# Patient Record
Sex: MALE | Race: WHITE | NOT HISPANIC OR LATINO | Employment: OTHER | URBAN - METROPOLITAN AREA
[De-identification: names, ages, dates, MRNs, and addresses within clinical notes are randomized per-mention and may not be internally consistent; named-entity substitution may affect disease eponyms.]

---

## 2018-07-10 ENCOUNTER — OFFICE VISIT (OUTPATIENT)
Dept: UROLOGY | Facility: MEDICAL CENTER | Age: 73
End: 2018-07-10
Payer: MEDICARE

## 2018-07-10 VITALS
BODY MASS INDEX: 21.7 KG/M2 | WEIGHT: 155 LBS | DIASTOLIC BLOOD PRESSURE: 80 MMHG | SYSTOLIC BLOOD PRESSURE: 136 MMHG | HEIGHT: 71 IN

## 2018-07-10 DIAGNOSIS — N52.02 CORPORO-VENOUS OCCLUSIVE ERECTILE DYSFUNCTION: ICD-10-CM

## 2018-07-10 DIAGNOSIS — N40.0 BPH WITHOUT OBSTRUCTION/LOWER URINARY TRACT SYMPTOMS: Primary | ICD-10-CM

## 2018-07-10 LAB
SL AMB  POCT GLUCOSE, UA: ABNORMAL
SL AMB LEUKOCYTE ESTERASE,UA: ABNORMAL
SL AMB POCT BILIRUBIN,UA: ABNORMAL
SL AMB POCT BLOOD,UA: ABNORMAL
SL AMB POCT CLARITY,UA: CLEAR
SL AMB POCT COLOR,UA: YELLOW
SL AMB POCT KETONES,UA: ABNORMAL
SL AMB POCT NITRITE,UA: ABNORMAL
SL AMB POCT PH,UA: 5
SL AMB POCT SPECIFIC GRAVITY,UA: 1.02
SL AMB POCT URINE PROTEIN: ABNORMAL
SL AMB POCT UROBILINOGEN: 0.2

## 2018-07-10 PROCEDURE — 99214 OFFICE O/P EST MOD 30 MIN: CPT | Performed by: UROLOGY

## 2018-07-10 PROCEDURE — 81003 URINALYSIS AUTO W/O SCOPE: CPT | Performed by: UROLOGY

## 2018-07-10 RX ORDER — SIMVASTATIN 20 MG
TABLET ORAL
COMMUNITY
Start: 2018-07-07

## 2018-07-10 RX ORDER — SILDENAFIL CITRATE 20 MG/1
100 TABLET ORAL AS NEEDED
Qty: 90 TABLET | Refills: 11 | Status: SHIPPED | OUTPATIENT
Start: 2018-07-10 | End: 2019-07-17

## 2018-07-10 RX ORDER — LEVOTHYROXINE SODIUM 0.05 MG/1
75 TABLET ORAL
COMMUNITY
Start: 2018-05-26

## 2018-07-10 NOTE — PROGRESS NOTES
Assessment/Plan:  1  BPH without obstructive symptoms  The patient is voiding without obstructive irritative voiding symptoms  PSA is stable and within normal limits  No further PSA testing is performed at this time due to his age of 74 years and stability of PSA and DR SOFIA Pham   Erectile dysfunction  The patient responds nicely to sildenafil 100 mg  Prescription was reordered  No problem-specific Assessment & Plan notes found for this encounter  Diagnoses and all orders for this visit:    BPH without obstruction/lower urinary tract symptoms  -     POCT urine dip auto non-scope  -     Comprehensive metabolic panel; Future    Corporo-venous occlusive erectile dysfunction  -     sildenafil (REVATIO) 20 mg tablet; Take 5 tablets (100 mg total) by mouth as needed (Erectile dysfunction)    Other orders  -     levothyroxine 50 mcg tablet;   -     simvastatin (ZOCOR) 20 mg tablet;           Subjective:      Patient ID: Suly Clay is a 74 y o  male  HPI 73-year-old male with a history of erectile dysfunction as well as prostatic enlargement presents denying dysuria frequency urgency gross hematuria incontinence or appreciable nocturia  The patient does have a good urinary stream by history  He notes responding well to Viagra 100 mg ordered as sildenafil for erectile dysfunction which previously was all marked by decreased rigidity and early loss of erection  The patient now is responding to pharmacologic therapy in that regard  The following portions of the patient's history were reviewed and updated as appropriate: allergies, current medications, past family history, past medical history, past social history, past surgical history and problem list     Review of Systems   Constitutional: Negative  HENT: Negative  Eyes: Negative  Respiratory: Negative  Cardiovascular: Negative  Gastrointestinal: Negative  Endocrine: Negative  Genitourinary: Negative      Musculoskeletal: Negative  Allergic/Immunologic: Negative  Neurological: Negative  Hematological: Negative  Psychiatric/Behavioral: Negative  Objective:      /80   Ht 5' 11" (1 803 m)   Wt 70 3 kg (155 lb)   BMI 21 62 kg/m²          Physical Exam   Constitutional: He is oriented to person, place, and time  He appears well-developed and well-nourished  HENT:   Head: Normocephalic and atraumatic  Eyes: Conjunctivae and EOM are normal    Neck: Neck supple  Pulmonary/Chest: Effort normal  No respiratory distress  Abdominal: Soft  Genitourinary: Penis normal    Genitourinary Comments: Phallus normal circumcised without cutaneous lesions  Meatus patent and normally placed  No palpable Peyronie's plaques  Scrotum normal without cutaneous lesions  Testes adnexa within normal limits without induration masses palpable fluid collection or tenderness  Perineum palpably normal   Digital rectal examination reveals a normal anal verge normal anal sphincter tone no palpable rectal masses and a 30 g a nodular nontender prostate   Neurological: He is alert and oriented to person, place, and time  Psychiatric: He has a normal mood and affect  His behavior is normal  Judgment and thought content normal    Vitals reviewed

## 2018-07-10 NOTE — LETTER
July 10, 2018     Darrall Ada  [address]    Patient: Mera Lindsey   YOB: 1945   Date of Visit: 7/10/2018       Dear Dr Michael Conner: Thank you for referring Kate Hoang to me for evaluation  Below are my notes for this consultation  If you have questions, please do not hesitate to call me  I look forward to following your patient along with you  Sincerely,        Khris Kirby MD        CC: No Recipients  Khris Kirby MD  7/10/2018 11:12 AM  Sign at close encounter  Assessment/Plan:  1  BPH without obstructive symptoms  The patient is voiding without obstructive irritative voiding symptoms  PSA is stable and within normal limits  No further PSA testing is performed at this time due to his age of 74 years and stability of PSA and DR SOFIA Pham   Erectile dysfunction  The patient responds nicely to sildenafil 100 mg  Prescription was reordered  No problem-specific Assessment & Plan notes found for this encounter  Diagnoses and all orders for this visit:    BPH without obstruction/lower urinary tract symptoms  -     POCT urine dip auto non-scope  -     Comprehensive metabolic panel; Future    Corporo-venous occlusive erectile dysfunction  -     sildenafil (REVATIO) 20 mg tablet; Take 5 tablets (100 mg total) by mouth as needed (Erectile dysfunction)    Other orders  -     levothyroxine 50 mcg tablet;   -     simvastatin (ZOCOR) 20 mg tablet;           Subjective:      Patient ID: Mera Lindsey is a 74 y o  male  HPI 68-year-old male with a history of erectile dysfunction as well as prostatic enlargement presents denying dysuria frequency urgency gross hematuria incontinence or appreciable nocturia  The patient does have a good urinary stream by history  He notes responding well to Viagra 100 mg ordered as sildenafil for erectile dysfunction which previously was all marked by decreased rigidity and early loss of erection    The patient now is responding to pharmacologic therapy in that regard  The following portions of the patient's history were reviewed and updated as appropriate: allergies, current medications, past family history, past medical history, past social history, past surgical history and problem list     Review of Systems   Constitutional: Negative  HENT: Negative  Eyes: Negative  Respiratory: Negative  Cardiovascular: Negative  Gastrointestinal: Negative  Endocrine: Negative  Genitourinary: Negative  Musculoskeletal: Negative  Allergic/Immunologic: Negative  Neurological: Negative  Hematological: Negative  Psychiatric/Behavioral: Negative  Objective:      /80   Ht 5' 11" (1 803 m)   Wt 70 3 kg (155 lb)   BMI 21 62 kg/m²           Physical Exam   Constitutional: He is oriented to person, place, and time  He appears well-developed and well-nourished  HENT:   Head: Normocephalic and atraumatic  Eyes: Conjunctivae and EOM are normal    Neck: Neck supple  Pulmonary/Chest: Effort normal  No respiratory distress  Abdominal: Soft  Genitourinary: Penis normal    Genitourinary Comments: Phallus normal circumcised without cutaneous lesions  Meatus patent and normally placed  No palpable Peyronie's plaques  Scrotum normal without cutaneous lesions  Testes adnexa within normal limits without induration masses palpable fluid collection or tenderness  Perineum palpably normal   Digital rectal examination reveals a normal anal verge normal anal sphincter tone no palpable rectal masses and a 30 g a nodular nontender prostate   Neurological: He is alert and oriented to person, place, and time  Psychiatric: He has a normal mood and affect  His behavior is normal  Judgment and thought content normal    Vitals reviewed

## 2018-07-10 NOTE — PROGRESS NOTES
IPSS Questionnaire (AUA-7): Over the past month    1)  How often have you had a sensation of not emptying your bladder completely after you finish urinating? 1 - Less than 1 time in 5   2)  How often have you had to urinate again less than two hours after you finished urinating? 0 - Not at all   3)  How often have you found you stopped and started again several times when you urinated? 0 - Not at all   4) How difficult have you found it to postpone urination? 0 - Not at all   5) How often have you had a weak urinary stream?  1 - Less than 1 time in 5   6) How often have you had to push or strain to begin urination?   0 - Not at all   7) How many times did you most typically get up to urinate from the time you went to bed until the time you got up in the morning?  0 - None   Total Score:  2

## 2019-07-17 ENCOUNTER — OFFICE VISIT (OUTPATIENT)
Dept: UROLOGY | Facility: MEDICAL CENTER | Age: 74
End: 2019-07-17
Payer: MEDICARE

## 2019-07-17 VITALS
SYSTOLIC BLOOD PRESSURE: 124 MMHG | HEIGHT: 70 IN | BODY MASS INDEX: 21.47 KG/M2 | DIASTOLIC BLOOD PRESSURE: 78 MMHG | HEART RATE: 68 BPM | WEIGHT: 150 LBS

## 2019-07-17 DIAGNOSIS — N52.02 CORPORO-VENOUS OCCLUSIVE ERECTILE DYSFUNCTION: ICD-10-CM

## 2019-07-17 DIAGNOSIS — N40.0 BPH WITHOUT OBSTRUCTION/LOWER URINARY TRACT SYMPTOMS: Primary | ICD-10-CM

## 2019-07-17 LAB
SL AMB  POCT GLUCOSE, UA: ABNORMAL
SL AMB LEUKOCYTE ESTERASE,UA: ABNORMAL
SL AMB POCT BILIRUBIN,UA: ABNORMAL
SL AMB POCT BLOOD,UA: ABNORMAL
SL AMB POCT CLARITY,UA: CLEAR
SL AMB POCT COLOR,UA: YELLOW
SL AMB POCT KETONES,UA: ABNORMAL
SL AMB POCT NITRITE,UA: ABNORMAL
SL AMB POCT PH,UA: 5.5
SL AMB POCT SPECIFIC GRAVITY,UA: 1.02
SL AMB POCT URINE PROTEIN: ABNORMAL
SL AMB POCT UROBILINOGEN: 0.2

## 2019-07-17 PROCEDURE — 99214 OFFICE O/P EST MOD 30 MIN: CPT | Performed by: UROLOGY

## 2019-07-17 PROCEDURE — 81003 URINALYSIS AUTO W/O SCOPE: CPT | Performed by: UROLOGY

## 2019-07-17 RX ORDER — SILDENAFIL 100 MG/1
100 TABLET, FILM COATED ORAL DAILY PRN
Qty: 15 TABLET | Refills: 5 | Status: SHIPPED | OUTPATIENT
Start: 2019-07-17

## 2019-07-17 NOTE — PROGRESS NOTES
Assessment/Plan:  1  BPH without obstructive symptoms-the patient is status post TURP many years ago and is voiding well with good urinary stream no obstructive irritative voiding symptoms  AUA symptom score is 3 with patient noting occasional minimal weakening of the urinary stream but otherwise normal voiding  The patient does request PSA testing despite AUA guidelines  2  Erectile dysfunction-the patient response to Viagra 100 mg and a prescription was written for that  No problem-specific Assessment & Plan notes found for this encounter  Diagnoses and all orders for this visit:    BPH without obstruction/lower urinary tract symptoms  -     POCT urine dip auto non-scope  -     Comprehensive metabolic panel; Future    Corporo-venous occlusive erectile dysfunction  -     sildenafil (VIAGRA) 100 mg tablet; Take 1 tablet (100 mg total) by mouth daily as needed for erectile dysfunction          Subjective:      Patient ID: Lovely Hanson is a 66 y o  male  HPI  75-year-old male well known to me status post TURP many years ago noting a good urinary stream full continence no dysuria frequency urgency  The patient continues to utilize sildenafil 100 mg for adequate erectile response  He requests another prescription  The following portions of the patient's history were reviewed and updated as appropriate: allergies, current medications, past family history, past medical history, past social history, past surgical history and problem list     Review of Systems   All other systems reviewed and are negative  Objective:      /78   Pulse 68   Ht 5' 10" (1 778 m)   Wt 68 kg (150 lb)   BMI 21 52 kg/m²          Physical Exam   Constitutional: He is oriented to person, place, and time  He appears well-developed and well-nourished  HENT:   Head: Atraumatic  Eyes: EOM are normal    Neck: Neck supple  Pulmonary/Chest: Effort normal    Abdominal: Soft  He exhibits no distension     Genitourinary: Genitourinary Comments: Phallus normal without cutaneous lesions circumcised meatus patent normally placed  Scrotum normal without cutaneous lesions  Testes adnexa palpably normal without adnexal abnormality  NAYE reveals a normal anal verge normal anal sphincter tone no palpable rectal masses and a 30 g a nodular nontender prostate   Neurological: He is alert and oriented to person, place, and time  Psychiatric: He has a normal mood and affect  His behavior is normal  Judgment and thought content normal    Vitals reviewed

## 2019-07-17 NOTE — LETTER
July 17, 2019     Velia Perkins  [address]    Patient: Marlyn Bowman   YOB: 1945   Date of Visit: 7/17/2019       Dear Dr Debra Hardy: Thank you for referring Heshamjose Kaye to me for evaluation  Below are my notes for this consultation  If you have questions, please do not hesitate to call me  I look forward to following your patient along with you  Sincerely,        Bruno Epley, MD        CC: No Recipients  Bruno Epley, MD  7/17/2019 11:01 AM  Sign at close encounter  Assessment/Plan:  1  BPH without obstructive symptoms-the patient is status post TURP many years ago and is voiding well with good urinary stream no obstructive irritative voiding symptoms  AUA symptom score is 3 with patient noting occasional minimal weakening of the urinary stream but otherwise normal voiding  The patient does request PSA testing despite AUA guidelines  2  Erectile dysfunction-the patient response to Viagra 100 mg and a prescription was written for that  No problem-specific Assessment & Plan notes found for this encounter  Diagnoses and all orders for this visit:    BPH without obstruction/lower urinary tract symptoms  -     POCT urine dip auto non-scope  -     Comprehensive metabolic panel; Future    Corporo-venous occlusive erectile dysfunction  -     sildenafil (VIAGRA) 100 mg tablet; Take 1 tablet (100 mg total) by mouth daily as needed for erectile dysfunction          Subjective:      Patient ID: Marlyn Bowman is a 66 y o  male  HPI  64-year-old male well known to me status post TURP many years ago noting a good urinary stream full continence no dysuria frequency urgency  The patient continues to utilize sildenafil 100 mg for adequate erectile response  He requests another prescription    The following portions of the patient's history were reviewed and updated as appropriate: allergies, current medications, past family history, past medical history, past social history, past surgical history and problem list     Review of Systems   All other systems reviewed and are negative  Objective:      /78   Pulse 68   Ht 5' 10" (1 778 m)   Wt 68 kg (150 lb)   BMI 21 52 kg/m²           Physical Exam   Constitutional: He is oriented to person, place, and time  He appears well-developed and well-nourished  HENT:   Head: Atraumatic  Eyes: EOM are normal    Neck: Neck supple  Pulmonary/Chest: Effort normal    Abdominal: Soft  He exhibits no distension  Genitourinary:   Genitourinary Comments: Phallus normal without cutaneous lesions circumcised meatus patent normally placed  Scrotum normal without cutaneous lesions  Testes adnexa palpably normal without adnexal abnormality  NAYE reveals a normal anal verge normal anal sphincter tone no palpable rectal masses and a 30 g a nodular nontender prostate   Neurological: He is alert and oriented to person, place, and time  Psychiatric: He has a normal mood and affect  His behavior is normal  Judgment and thought content normal    Vitals reviewed

## 2020-07-29 ENCOUNTER — OFFICE VISIT (OUTPATIENT)
Dept: UROLOGY | Facility: MEDICAL CENTER | Age: 75
End: 2020-07-29
Payer: MEDICARE

## 2020-07-29 VITALS
TEMPERATURE: 98.9 F | HEIGHT: 70 IN | DIASTOLIC BLOOD PRESSURE: 70 MMHG | WEIGHT: 150 LBS | BODY MASS INDEX: 21.47 KG/M2 | SYSTOLIC BLOOD PRESSURE: 130 MMHG

## 2020-07-29 DIAGNOSIS — N13.8 BPH WITH OBSTRUCTION/LOWER URINARY TRACT SYMPTOMS: Primary | ICD-10-CM

## 2020-07-29 DIAGNOSIS — N52.02 CORPORO-VENOUS OCCLUSIVE ERECTILE DYSFUNCTION: ICD-10-CM

## 2020-07-29 DIAGNOSIS — N40.1 BPH WITH OBSTRUCTION/LOWER URINARY TRACT SYMPTOMS: Primary | ICD-10-CM

## 2020-07-29 PROCEDURE — 99213 OFFICE O/P EST LOW 20 MIN: CPT | Performed by: UROLOGY

## 2020-07-29 RX ORDER — ATORVASTATIN CALCIUM 20 MG/1
20 TABLET, FILM COATED ORAL EVERY EVENING
COMMUNITY
Start: 2020-06-05

## 2020-07-29 NOTE — PROGRESS NOTES
Assessment/Plan:    1  BPH with obstructive symptoms -resolved with AUA symptom score for status post TURP  -no further intervention    2  Erectile dysfunction -patient not sexually active  Diagnoses and all orders for this visit:    BPH with obstruction/lower urinary tract symptoms    Corporo-venous occlusive erectile dysfunction    Other orders  -     atorvastatin (LIPITOR) 20 mg tablet; Take 20 mg by mouth every evening          Subjective:     Patient ID: Venida Duane is a 66 y o  male  HPI    68-year-old male presents for follow-up of BPH with previous TURP currently voiding well with some weakening of the urinary stream but no straining to void urgency or frequency  Patient is overall pleased with voiding pattern  He dysuria frequency urgency  Patient's wife passed away back in December of 2019  Review of Systems   All other systems reviewed and are negative  Objective:     Physical Exam   Constitutional: He is oriented to person, place, and time  He appears well-developed and well-nourished  No distress  HENT:   Head: Normocephalic and atraumatic  Eyes: EOM are normal    Neck: Normal range of motion  Pulmonary/Chest: Effort normal  No respiratory distress  Neurological: He is alert and oriented to person, place, and time  Skin: He is not diaphoretic  Psychiatric: He has a normal mood and affect  His behavior is normal  Judgment and thought content normal    Vitals reviewed

## 2021-03-26 ENCOUNTER — IMMUNIZATIONS (OUTPATIENT)
Dept: FAMILY MEDICINE CLINIC | Facility: HOSPITAL | Age: 76
End: 2021-03-26

## 2021-03-26 DIAGNOSIS — Z23 ENCOUNTER FOR IMMUNIZATION: Primary | ICD-10-CM

## 2021-03-26 PROCEDURE — 0011A SARS-COV-2 / COVID-19 MRNA VACCINE (MODERNA) 100 MCG: CPT

## 2021-03-26 PROCEDURE — 91301 SARS-COV-2 / COVID-19 MRNA VACCINE (MODERNA) 100 MCG: CPT

## 2021-04-28 ENCOUNTER — IMMUNIZATIONS (OUTPATIENT)
Dept: FAMILY MEDICINE CLINIC | Facility: HOSPITAL | Age: 76
End: 2021-04-28

## 2021-04-28 DIAGNOSIS — Z23 ENCOUNTER FOR IMMUNIZATION: Primary | ICD-10-CM

## 2021-04-28 PROCEDURE — 91301 SARS-COV-2 / COVID-19 MRNA VACCINE (MODERNA) 100 MCG: CPT

## 2021-04-28 PROCEDURE — 0012A SARS-COV-2 / COVID-19 MRNA VACCINE (MODERNA) 100 MCG: CPT
